# Patient Record
Sex: MALE | Race: WHITE
[De-identification: names, ages, dates, MRNs, and addresses within clinical notes are randomized per-mention and may not be internally consistent; named-entity substitution may affect disease eponyms.]

---

## 2018-04-11 ENCOUNTER — HOSPITAL ENCOUNTER (EMERGENCY)
Dept: HOSPITAL 80 - FED | Age: 33
Discharge: HOME | End: 2018-04-11
Payer: COMMERCIAL

## 2018-04-11 VITALS — DIASTOLIC BLOOD PRESSURE: 56 MMHG | SYSTOLIC BLOOD PRESSURE: 105 MMHG

## 2018-04-11 DIAGNOSIS — B34.9: Primary | ICD-10-CM

## 2018-04-11 DIAGNOSIS — E86.9: ICD-10-CM

## 2018-04-11 LAB — PLATELET # BLD: 232 10^3/UL (ref 150–400)

## 2018-04-11 NOTE — EDPHY
H & P


Stated Complaint: fever





- Personal History


Current Tetanus/Diphtheria Vaccine: Unsure


Current Tetanus Diphtheria and Acellular Pertussis (TDAP): Unsure





- Medical/Surgical History


Hx Asthma: No


Hx Chronic Respiratory Disease: No


Hx Diabetes: No


Hx Cardiac Disease: No


Hx Renal Disease: No


Hx Cirrhosis: No


Hx Alcoholism: No


Hx HIV/AIDS: No


Hx Splenectomy or Spleen Trauma: No


Other PMH: DENIES





- Social History


Smoking Status: Never smoked


Time Seen by Provider: 04/11/18 09:57


HPI/ROS: 





CHIEF COMPLAINT:  Diarrhea, flu-like symptoms





HISTORY OF PRESENT ILLNESS:  32-year-old male generally healthy, no influenza 

vaccination, complaining of 5 days of flu-like symptoms including myalgias, 

fever, chills with development of diarrhea and nausea for the past 24-36 hr.  

No abdominal pain.  No vomiting.  He is concerned about his hydration status.  

Urinary habits have been normal with normal urine output, no dysuria no 

hematuria increased frequency.  No untreated water sources.  No known sick 

contacts.  No recent antibiotic use.  No international travel.  No chest pain.  

No back or flank pain.  No melena or hematochezia











REVIEW OF SYSTEMS:


A ten point review of systems was performed and is negative with the exception 

of the items mentioned in the HPI








PAST MEDICAL & SURGICAL  HISTORY:  No pertinent medical or surgical history    





SOCIAL HISTORY: Nonsmoker.  Social alcohol use.    











************


PHYSICAL EXAM





(Prior to examination, patient consented to physical exam, hands were washed 

and my usual and customary physical exam procedures followed)


1) GENERAL: Well-developed, well-nourished, alert and oriented.  Appears to be 

in no acute distress.


2) HEAD: Normocephalic, atraumatic


3) HEENT: Pupils equal, round, reactive to light bilaterally.  Sclera 

anicteric.  Nasopharynx, oropharynx, clear, no lesions.  Dry mucous membranes.  

No tonsillar enlargement or exudate  Ears bilaterally with normal tympanic 

membranes.  No signs of otitis media or otitis externa.


4) NECK: Full range of motion, no meningeal signs.


5) LUNGS: Clear auscultation bilaterally, no wheezes, no rhonchi, no 

retractions.   


6) HEART: Regular rate and rhythm, no murmur, no heave, no gallop.


7) ABDOMEN: No guarding, no rebound, no focal tenderness, negative McBurney's, 

negative You's, negative Rovsing's, negative peritoneal sign, unable to 

elicit any abdominal pain on exam


8) MUSCULOSKELETAL: Moving all extremities, no focal areas of tenderness, no 

obvious trauma.  No peripheral edema or discoloration.


9) BACK: No CVA tenderness, no midline vertebral tenderness, no fluctuance, no 

step-off, no obvious trauma, no visual or palpable abnormality. 


10) SKIN: No rash, no petechiae. 


11) Psychiatric:  Patient is oriented X 3, there is no agitation.








***************





DIFFERENTIAL DIAGNOSIS:   In no particular order including but limited to 

influenza, viral syndrome, gastroenteritis


 (JOSEPH Raymond)


Constitutional: 





 Initial Vital Signs











Temperature (C)  37 C   04/11/18 09:54


 


Heart Rate  96   04/11/18 09:54


 


Respiratory Rate  16   04/11/18 09:54


 


Blood Pressure  104/63   04/11/18 09:54


 


O2 Sat (%)  99   04/11/18 09:54








 











O2 Delivery Mode               Room Air














Allergies/Adverse Reactions: 


 





No Known Allergies Allergy (Unverified 04/11/18 09:54)


 








Home Medications: 














 Medication  Instructions  Recorded


 


Ibuprofen  04/11/18


 


Ondansetron Odt [Zofran Odt] 4 mg PO Q4PRN PRN #10 tab 04/11/18


 


Tylenol Cold-Flu Severe Caplet  04/11/18














Medical Decision Making


ED Course/Re-evaluation: 





10:05 a.m.:  Patient has dry mucous membranes.  Will obtain diagnostic studies 

administer IV hydration re-evaluated.  Doubt acute surgical abdominal pathology 

at this time.  Care of patient under supervision of primary supervising 

physician Dr Leslie. 





10:50 a.m.:  Re-evaluation after Zofran IV fluids.  He is feeling improvement.  

Doubt acute surgical abdominal pathology.  I think the patient can be 

discharged home with usual and customary discharge precautions instructions.  

Recommend continued hydration.  Return to the ER if he develops abdominal pain, 

inability to tolerate oral intake or any other symptoms that concern him. (

JOSEPH Raymond)


Other Provider: 





PHYSICIAN DOCUMENTATION:


The patient was evaluated and managed by the Physician Assistant.  My co-

signature indicates that I have reviewed this chart and I agree with the 

findings and plan of care as documented.  I am the secondary supervising 

physician. (Ori Leslie)





- Data Points


Laboratory Results: 





 Laboratory Results





 04/11/18 10:05 





 04/11/18 10:05 





 











  04/11/18 04/11/18 04/11/18





  10:05 10:05 10:00


 


WBC    7.26 10^3/uL 10^3/uL  





    (3.80-9.50)  


 


RBC    5.47 10^6/uL 10^6/uL  





    (4.40-6.38)  


 


Hgb    17.2 g/dL g/dL  





    (13.7-17.5)  


 


Hct    48.1 % %  





    (40.0-51.0)  


 


MCV    87.9 fL fL  





    (81.5-99.8)  


 


MCH    31.4 pg pg  





    (27.9-34.1)  


 


MCHC    35.8 g/dL g/dL  





    (32.4-36.7)  


 


RDW    11.8 % %  





    (11.5-15.2)  


 


Plt Count    232 10^3/uL 10^3/uL  





    (150-400)  


 


MPV    9.8 fL fL  





    (8.7-11.7)  


 


Neut % (Auto)    86.6 % H %  





    (39.3-74.2)  


 


Lymph % (Auto)    5.5 % L %  





    (15.0-45.0)  


 


Mono % (Auto)    7.0 % %  





    (4.5-13.0)  


 


Eos % (Auto)    0.3 % L %  





    (0.6-7.6)  


 


Baso % (Auto)    0.3 % %  





    (0.3-1.7)  


 


Nucleat RBC Rel Count    0.0 % %  





    (0.0-0.2)  


 


Absolute Neuts (auto)    6.29 10^3/uL 10^3/uL  





    (1.70-6.50)  


 


Absolute Lymphs (auto)    0.40 10^3/uL L 10^3/uL  





    (1.00-3.00)  


 


Absolute Monos (auto)    0.51 10^3/uL 10^3/uL  





    (0.30-0.80)  


 


Absolute Eos (auto)    0.02 10^3/uL L 10^3/uL  





    (0.03-0.40)  


 


Absolute Basos (auto)    0.02 10^3/uL 10^3/uL  





    (0.02-0.10)  


 


Absolute Nucleated RBC    0.00 10^3/uL 10^3/uL  





    (0-0.01)  


 


Immature Gran %    0.3 % %  





    (0.0-1.1)  


 


Immature Gran #    0.02 10^3/uL 10^3/uL  





    (0.00-0.10)  


 


Sodium  143 mEq/L mEq/L    





   (135-145)   


 


Potassium  4.1 mEq/L mEq/L    





   (3.5-5.2)   


 


Chloride  105 mEq/L mEq/L    





   ()   


 


Carbon Dioxide  21 mEq/l L mEq/l    





   (22-31)   


 


Anion Gap  17 mEq/L H mEq/L    





   (8-16)   


 


BUN  29 mg/dL H mg/dL    





   (7-23)   


 


Creatinine  0.9 mg/dL mg/dL    





   (0.7-1.3)   


 


Estimated GFR  > 60     





    


 


Glucose  120 mg/dL H mg/dL    





   ()   


 


Calcium  9.4 mg/dL mg/dL    





   (8.5-10.4)   


 


Total Bilirubin  1.3 mg/dL mg/dL    





   (0.1-1.4)   


 


Conjugated Bilirubin  0.4 mg/dL mg/dL    





   (0.0-0.5)   


 


Unconjugated Bilirubin  0.9 mg/dL mg/dL    





   (0.0-1.1)   


 


AST  19 IU/L IU/L    





   (17-59)   


 


ALT  31 IU/L IU/L    





   (21-72)   


 


Alkaline Phosphatase  48 IU/L IU/L    





   ()   


 


Total Protein  7.0 g/dL g/dL    





   (6.3-8.2)   


 


Albumin  4.2 g/dL g/dL    





   (3.5-5.0)   


 


Lipase  101 IU/L IU/L    





   ()   


 


Nasal Influenza A PCR      Pending 





    


 


Nasal Influenza B PCR      Pending 





    











Medications Given: 





 








Discontinued Medications





Sodium Chloride (Ns)  1,000 mls @ 0 mls/hr IV ONCE ONE


   PRN Reason: Wide Open


   Stop: 04/11/18 10:05


   Last Admin: 04/11/18 10:05 Dose:  1,000 mls


Ondansetron HCl (Zofran)  4 mg IVP EDNOW ONE


   Stop: 04/11/18 10:07


   Last Admin: 04/11/18 10:33 Dose:  4 mg








Departure





- Departure


Disposition: Home, Routine, Self-Care


Clinical Impression: 


 Viral syndrome, Volume depletion





Condition: Good


Instructions:  Gastroenteritis (ED)


Additional Instructions: 


Seek immediate medical attention if you develop new or worsening symptoms, if 

you develop abdominal pain, if you develop fevers, chills, inability to 

tolerate oral intake or any other symptoms that concerns you.


Referrals: 


Charbel Tatum MD [[facility] [place] [facility]] - 1-2 days without fail


Prescriptions: 


Ondansetron Odt [Zofran Odt] 4 mg PO Q4PRN PRN #10 tab


 PRN Reason: Nausea